# Patient Record
(demographics unavailable — no encounter records)

---

## 2018-06-05 NOTE — RADIOLOGY REPORT (SQ)
EXAM DESCRIPTION:  CT HEAD WITHOUT



COMPLETED DATE/TIME:  6/5/2018 4:40 pm



REASON FOR STUDY:  headache for 5 days



COMPARISON:  2/5/2014.



TECHNIQUE:  Axial images acquired through the brain without intravenous contrast.  Images reviewed wi
th bone, brain and subdural windows.  Additional sagittal and coronal reconstructions were generated.
 Images stored on PACS.

All CT scanners at this facility use dose modulation, iterative reconstruction, and/or weight based d
osing when appropriate to reduce radiation dose to as low as reasonably achievable (ALARA).

[facility]: Dose Right  [facility]: CareDose    [facility]: Dose Right    [facility]: Teradose 4D    [facility]: Smart Technologies



RADIATION DOSE:  CT Rad equipment meets quality standard of care and radiation dose reduction techniq
ues were employed. CTDIvol: 53.2 mGy. DLP: 1044 mGy-cm. mGy.



LIMITATIONS:  None.



FINDINGS:  VENTRICLES: Normal size and contour.

CEREBRUM: No masses.  No hemorrhage.  No midline shift.  No evidence for acute infarction. Normal gra
y/white matter differentiation. No areas of low density in the white matter.

CEREBELLUM: No masses.  No hemorrhage.  No alteration of density.  No evidence for acute infarction.

EXTRAAXIAL SPACES: No fluid collections.  No masses.

ORBITS AND GLOBE: No intra- or extraconal masses.  Normal contour of globe without masses.

CALVARIUM: No fracture.

PARANASAL SINUSES: No fluid or mucosal thickening.

SOFT TISSUES: No mass or hematoma.

OTHER: No other significant finding.



IMPRESSION:  NORMAL BRAIN CT WITHOUT CONTRAST.

EVIDENCE OF ACUTE STROKE: NO.



COMMENT:  Quality ID # [id number]: Final reports with documentation of one or more dose reduction techniques
 (e.g., Automated exposure control, adjustment of the mA and/or kV according to patient size, use of 
iterative reconstruction technique)



TECHNICAL DOCUMENTATION:  JOB ID:  [id number]

 2011 [employer]- All Rights Reserved



Reading location - IP/workstation name: [email or web address]

## 2018-06-05 NOTE — ER DOCUMENT REPORT
HPI





- HPI


Pain Level: 5


Context: 





Patient is a 19-year-old female who presents here to see department the chief 

complaint of headache.  Patient admits to sore neck.  She admits to symptoms 

for the past 5 days.  She states that she took Motrin twice any significant 

improvement last time was 2 days ago.  She admits to sinus congestion and 

pressure whenever she bends over.  She admits to body aches but she denies any 

sore throat, nausea, vomiting, abdominal pain, fevers or chills she denies a 

history of mono





- NEURO


Neurology: REPORTS: Headache, Dizzinesss / Vertigo





- REPRODUCTIVE


Reproductive: DENIES: Pregnant:





Past Medical History





- Social History


Smoking Status: Current Every Day Smoker


Family History: Reviewed & Not Pertinent


Patient has suicidal ideation: No


Patient has homicidal ideation: No


Pulmonary Medical History: Reports: Hx Asthma


Renal/ Medical History: Denies: Hx Peritoneal Dialysis





- Immunizations


Immunizations up to date: Yes


Hx Pneumococcal Vaccination: 01/01/01





Vertical Provider Document





- CONSTITUTIONAL


Agree With Documented VS: Yes


Notes: 





PHYSICAL EXAM


GENERAL: Alert, interacts well. 


HEAD: Normocephalic, atraumatic.


EYES: Pupils equal, round, and reactive to light. Extraocular movements intact.


ENT: Oral mucosa moist, tongue midline. 


NECK: Tension in tenderness with pain reproducible palpation of the bilateral 

cervical musculature with full range of motion. Supple. Trachea midline.


LUNGS: Clear to auscultation bilaterally, no wheezes, rales, or rhonchi. No 

respiratory distress.


HEART: Regular rate and rhythm. No murmurs, gallops, or rubs.


ABDOMEN: Soft,  nondistended, nontender. No guarding, rebound, or rigidity.. 

Bowel sounds present in all 4 quadrants.


EXTREMITIES: Moves all 4 extremities spontaneously. No edema, radial and 

dorsalis pedis pulses 2/4 bilaterally. No cyanosis. 


NEUROLOGICAL: Alert and oriented x4. Normal speech.  Negative straight leg raise


PSYCH: Normal affect, normal mood.


SKIN: Warm, dry, normal turgor. No rashes or lesions noted.








- INFECTION CONTROL


TRAVEL OUTSIDE OF THE [place] IN LAST 30 DAYS: No





Course





- Re-evaluation


Re-evalutation: 





06/05/18 16:27


Patient is a 19-year-old female who presents with symptoms complaint of 

headache.  Patient states that she has had this for the past 5 days it has not 

improved with any over-the-counter medications.  Patient mono is negative and 

patient states that her headaches are improved after p.o. Fioricet.  Given the 

patient denies any history of headaches will send for CT the head.





06/05/18 17:11


Patient does not have any focal neurologic deficits, nuchal rigidity, vital 

signs are within normal limits no papilledema.  Patient is otherwise no acute 

distress and hemodynamically stable.  Low index for suspicion of acute 

subarachnoid hemorrhage, meningitis or mass.  Low suspicion for acute life-

threatening etiology with intact neuro exam therefore no additional imaging or 

laboratory testing is indicated.  Will discharge patient home with strict follow

-up with PCP for blood pressure check within the next week.





- Vital Signs


Vital signs: 


 











Temp Pulse Resp BP Pulse Ox


 


 99.2 F   89   16   124/69   98 


 


 06/05/18 15:22  06/05/18 15:22  06/05/18 15:22  06/05/18 15:22  06/05/18 15:22














- Diagnostic Test


Radiology reviewed: Image reviewed, Reports reviewed





Discharge





- Discharge


Clinical Impression: 


 Headache





Condition: Good


Disposition: HOME, SELF-CARE


Additional Instructions: 


you have been seen in the Emergency Department (ED) for a headache.  Please use 

Tylenol (acetaminophen) or Motrin (ibuprofen) as needed for symptoms, but only 

as written on the box. 





As we have discussed, please follow up with your primary care doctor as soon as 

possible regarding today's ED visit and your headache symptoms.  Please start 

taking a over-the-counter antihistamine to help with your sinus congestion.This 

includes Zyrtec versus Claritin or Allegra 





Call your doctor or return to the ED if you have a worsening headache, sudden 

and severe headache, confusion, slurred speech, facial droop, weakness or 

numbness in any arm or leg, extreme fatigue, or other symptoms that concern you.